# Patient Record
Sex: MALE | Race: WHITE | Employment: OTHER | URBAN - METROPOLITAN AREA
[De-identification: names, ages, dates, MRNs, and addresses within clinical notes are randomized per-mention and may not be internally consistent; named-entity substitution may affect disease eponyms.]

---

## 2018-04-21 ENCOUNTER — APPOINTMENT (OUTPATIENT)
Dept: GENERAL RADIOLOGY | Facility: HOSPITAL | Age: 63
End: 2018-04-21
Attending: EMERGENCY MEDICINE
Payer: MEDICARE

## 2018-04-21 ENCOUNTER — HOSPITAL ENCOUNTER (OUTPATIENT)
Facility: HOSPITAL | Age: 63
Setting detail: OBSERVATION
Discharge: NURSING, REHAB OR LONG-TERM CARE | End: 2018-04-22
Attending: EMERGENCY MEDICINE | Admitting: HOSPITALIST
Payer: MEDICARE

## 2018-04-21 DIAGNOSIS — R07.89 CHEST PAIN, ATYPICAL: Primary | ICD-10-CM

## 2018-04-21 DIAGNOSIS — R06.00 DYSPNEA, UNSPECIFIED TYPE: ICD-10-CM

## 2018-04-21 DIAGNOSIS — F11.23 NARCOTIC WITHDRAWAL (HCC): ICD-10-CM

## 2018-04-21 PROBLEM — R73.9 HYPERGLYCEMIA: Status: ACTIVE | Noted: 2018-04-21

## 2018-04-21 PROCEDURE — 99220 INITIAL OBSERVATION CARE,LEVL III: CPT | Performed by: INTERNAL MEDICINE

## 2018-04-21 PROCEDURE — 71046 X-RAY EXAM CHEST 2 VIEWS: CPT | Performed by: EMERGENCY MEDICINE

## 2018-04-21 RX ORDER — ONDANSETRON 2 MG/ML
4 INJECTION INTRAMUSCULAR; INTRAVENOUS EVERY 4 HOURS PRN
Status: DISCONTINUED | OUTPATIENT
Start: 2018-04-21 | End: 2018-04-21

## 2018-04-21 RX ORDER — OXYCODONE HYDROCHLORIDE 15 MG/1
15 TABLET, FILM COATED, EXTENDED RELEASE ORAL EVERY 12 HOURS PRN
COMMUNITY

## 2018-04-21 RX ORDER — ONDANSETRON 2 MG/ML
4 INJECTION INTRAMUSCULAR; INTRAVENOUS EVERY 4 HOURS PRN
Status: DISCONTINUED | OUTPATIENT
Start: 2018-04-21 | End: 2018-04-22

## 2018-04-21 RX ORDER — ONDANSETRON 2 MG/ML
4 INJECTION INTRAMUSCULAR; INTRAVENOUS ONCE
Status: COMPLETED | OUTPATIENT
Start: 2018-04-21 | End: 2018-04-21

## 2018-04-21 RX ORDER — ASPIRIN 325 MG
325 TABLET ORAL DAILY
Status: DISCONTINUED | OUTPATIENT
Start: 2018-04-22 | End: 2018-04-22

## 2018-04-21 RX ORDER — ONDANSETRON 4 MG/1
4 TABLET, ORALLY DISINTEGRATING ORAL EVERY 4 HOURS PRN
Status: DISCONTINUED | OUTPATIENT
Start: 2018-04-21 | End: 2018-04-22

## 2018-04-21 RX ORDER — LOPERAMIDE HYDROCHLORIDE 2 MG/1
2 CAPSULE ORAL EVERY 4 HOURS PRN
Status: DISCONTINUED | OUTPATIENT
Start: 2018-04-21 | End: 2018-04-22

## 2018-04-21 RX ORDER — SODIUM CHLORIDE 9 MG/ML
1000 INJECTION, SOLUTION INTRAVENOUS ONCE
Status: COMPLETED | OUTPATIENT
Start: 2018-04-21 | End: 2018-04-21

## 2018-04-21 RX ORDER — SODIUM CHLORIDE 9 MG/ML
INJECTION, SOLUTION INTRAVENOUS CONTINUOUS
Status: ACTIVE | OUTPATIENT
Start: 2018-04-21 | End: 2018-04-21

## 2018-04-21 RX ORDER — POLYETHYLENE GLYCOL 3350 17 G/17G
17 POWDER, FOR SOLUTION ORAL DAILY PRN
Status: DISCONTINUED | OUTPATIENT
Start: 2018-04-21 | End: 2018-04-22

## 2018-04-21 RX ORDER — BISACODYL 10 MG
10 SUPPOSITORY, RECTAL RECTAL
Status: DISCONTINUED | OUTPATIENT
Start: 2018-04-21 | End: 2018-04-22

## 2018-04-21 RX ORDER — ACETAMINOPHEN 325 MG/1
650 TABLET ORAL EVERY 6 HOURS PRN
Status: DISCONTINUED | OUTPATIENT
Start: 2018-04-21 | End: 2018-04-22

## 2018-04-21 RX ORDER — IBUPROFEN 400 MG/1
400 TABLET ORAL EVERY 4 HOURS PRN
Status: DISCONTINUED | OUTPATIENT
Start: 2018-04-21 | End: 2018-04-22

## 2018-04-21 RX ORDER — TRAZODONE HYDROCHLORIDE 100 MG/1
50 TABLET ORAL NIGHTLY PRN
Status: DISCONTINUED | OUTPATIENT
Start: 2018-04-21 | End: 2018-04-22

## 2018-04-21 RX ORDER — DICYCLOMINE HCL 20 MG
20 TABLET ORAL EVERY 4 HOURS PRN
Status: DISCONTINUED | OUTPATIENT
Start: 2018-04-21 | End: 2018-04-22

## 2018-04-21 RX ORDER — LORAZEPAM 1 MG/1
1 TABLET ORAL EVERY 4 HOURS PRN
Status: DISCONTINUED | OUTPATIENT
Start: 2018-04-21 | End: 2018-04-22

## 2018-04-21 RX ORDER — NITROGLYCERIN 0.4 MG/1
0.4 TABLET SUBLINGUAL EVERY 5 MIN PRN
Status: DISCONTINUED | OUTPATIENT
Start: 2018-04-21 | End: 2018-04-22

## 2018-04-21 RX ORDER — DOCUSATE SODIUM 100 MG/1
100 CAPSULE, LIQUID FILLED ORAL 2 TIMES DAILY
Status: DISCONTINUED | OUTPATIENT
Start: 2018-04-21 | End: 2018-04-22

## 2018-04-21 RX ORDER — ONDANSETRON 2 MG/ML
4 INJECTION INTRAMUSCULAR; INTRAVENOUS EVERY 6 HOURS PRN
Status: DISCONTINUED | OUTPATIENT
Start: 2018-04-21 | End: 2018-04-22

## 2018-04-21 RX ORDER — ASPIRIN 81 MG/1
324 TABLET, CHEWABLE ORAL ONCE
Status: COMPLETED | OUTPATIENT
Start: 2018-04-21 | End: 2018-04-21

## 2018-04-21 RX ORDER — LORAZEPAM 2 MG/ML
0.5 INJECTION INTRAMUSCULAR ONCE
Status: COMPLETED | OUTPATIENT
Start: 2018-04-21 | End: 2018-04-21

## 2018-04-21 RX ORDER — SODIUM PHOSPHATE, DIBASIC AND SODIUM PHOSPHATE, MONOBASIC 7; 19 G/133ML; G/133ML
1 ENEMA RECTAL ONCE AS NEEDED
Status: DISCONTINUED | OUTPATIENT
Start: 2018-04-21 | End: 2018-04-22

## 2018-04-21 RX ORDER — LORAZEPAM 2 MG/ML
1 INJECTION INTRAMUSCULAR ONCE
Status: COMPLETED | OUTPATIENT
Start: 2018-04-21 | End: 2018-04-21

## 2018-04-21 RX ORDER — LORAZEPAM 0.5 MG/1
0.5 TABLET ORAL EVERY 4 HOURS PRN
Status: DISCONTINUED | OUTPATIENT
Start: 2018-04-21 | End: 2018-04-22

## 2018-04-21 NOTE — ED PROVIDER NOTES
Patient Seen in: [facility] Emergency Department    History   Patient presents with:  Eval-P (psychiatric)    Stated Complaint: detox of oxycontin    HPI    Patient is a 19-year-old male presents emergency room with a history of OxyContin use that h °C) (Temporal)   Resp 14   Ht 182.9 cm (6')   Wt 88.5 kg   SpO2 98%   BMI 26.45 kg/m²         Physical Exam  GENERAL: Well-developed, well-nourished male sitting up breathing easily in no apparent distress patient is nontoxic in appearance. Booker Gonzalez   HEENT: Head i units/mL. CBC WITH DIFFERENTIAL WITH PLATELET    Narrative: The following orders were created for panel order CBC WITH DIFFERENTIAL WITH PLATELET.   Procedure                               Abnormality         Status                     --------- further care.       Admission disposition: 4/21/2018  7:30 PM           Disposition and Plan     Clinical Impression:  Chest pain, atypical  (primary encounter diagnosis)  Dyspnea, unspecified type  Narcotic withdrawal (Juliet Mcmanus)    Disposition:  Admit  4/21/201

## 2018-04-21 NOTE — ED INITIAL ASSESSMENT (HPI)
Patient states \"I am here for oxycontin withdrawal.\" Patient reports he ran out of oxycontin yesterday, approximately 30 hours ago. He reports that his PCP decreased his dose from 3 tablets a day to 2 tablets a day.  Patient states he saw his PCP yesterda

## 2018-04-22 VITALS
HEART RATE: 52 BPM | BODY MASS INDEX: 26.41 KG/M2 | SYSTOLIC BLOOD PRESSURE: 149 MMHG | WEIGHT: 195 LBS | DIASTOLIC BLOOD PRESSURE: 71 MMHG | HEIGHT: 72 IN | TEMPERATURE: 98 F | OXYGEN SATURATION: 98 % | RESPIRATION RATE: 18 BRPM

## 2018-04-22 PROBLEM — F11.20 UNCOMPLICATED OPIOID DEPENDENCE (HCC): Status: ACTIVE | Noted: 2018-04-22

## 2018-04-22 PROBLEM — F32.A DEPRESSION: Status: ACTIVE | Noted: 2018-04-22

## 2018-04-22 PROBLEM — F41.9 ANXIETY DISORDER, UNSPECIFIED: Status: ACTIVE | Noted: 2018-04-22

## 2018-04-22 PROCEDURE — 99217 OBSERVATION CARE DISCHARGE: CPT | Performed by: HOSPITALIST

## 2018-04-22 PROCEDURE — 99222 1ST HOSP IP/OBS MODERATE 55: CPT | Performed by: OTHER

## 2018-04-22 RX ORDER — BUPRENORPHINE 2 MG/1
4 TABLET SUBLINGUAL AS NEEDED
Status: DISCONTINUED | OUTPATIENT
Start: 2018-04-22 | End: 2018-04-22

## 2018-04-22 RX ORDER — BUPRENORPHINE 2 MG/1
2 TABLET SUBLINGUAL EVERY 6 HOURS PRN
Status: DISCONTINUED | OUTPATIENT
Start: 2018-04-25 | End: 2018-04-22

## 2018-04-22 RX ORDER — BUPRENORPHINE 2 MG/1
2 TABLET SUBLINGUAL EVERY 8 HOURS PRN
Status: DISCONTINUED | OUTPATIENT
Start: 2018-04-26 | End: 2018-04-22

## 2018-04-22 RX ORDER — BUPRENORPHINE 2 MG/1
2 TABLET SUBLINGUAL EVERY 2 HOUR PRN
Status: DISCONTINUED | OUTPATIENT
Start: 2018-04-23 | End: 2018-04-22

## 2018-04-22 RX ORDER — BUPRENORPHINE 2 MG/1
2 TABLET SUBLINGUAL EVERY 2 HOUR PRN
Status: DISCONTINUED | OUTPATIENT
Start: 2018-04-22 | End: 2018-04-22

## 2018-04-22 RX ORDER — BUPRENORPHINE 2 MG/1
2 TABLET SUBLINGUAL EVERY 4 HOURS PRN
Status: DISCONTINUED | OUTPATIENT
Start: 2018-04-24 | End: 2018-04-22

## 2018-04-22 NOTE — PLAN OF CARE
A/Ox4- noted to be forgetful at times  On RA  NSR on tele  C/o generalized pain all over his body, PRN Tylenol given per [facility]  Denies chest pain  VSS, afebrile  Per pt, only had 1 BM today- no diarrhea   No Opioids per MD  Pt [facility]

## 2018-04-22 NOTE — CONSULTS
[facility]  Psychiatric Evaluation    Teo Jaygibson YOB: 1955   Age/Gender 60year old male MRN [id number]   [facility] 3NE-A Attending Kelly Reynoso MD   Hosp Day # 0 PCP PHYSICIAN NONSTAFF     Date of Jaylene Fuller the next pain pill. Anxiety is moderate, and is related to the situation he is in with opiate dependency issues. The patient reports \"terrible\" sleep. He wakes frequently with nightmares. Appetite has been okay. Concentration \"is there. \"  The pa never abused. He dropped out of school after freshman year of high school. He never got a GED. The patient is not currently working but used to be a . He lives by himself. He is on disability. When asked about supports, he said \"myself. \" Secondary Psychiatric Diagnoses  Depression, nos. Anxiety, nos. Rule Out Diagnoses  Substance induced anxiety and depression. Pervasive Diagnoses  Disorders–deferred. Neurodevelopmental disorders none.     Pertinent Non-Psychiatric Diagnoses

## 2018-04-22 NOTE — H&P
[facility] HOSPITALIST  History and Physical     [facility] Patient Status:  Emergency    1955 MRN [id number]   Location [address] [facility] Attending Dipti Foster MD   Hosp Day # 0 PCP PHYSICIAN NONSTAFF     Chief Compl completed. Pertinent positives and negatives noted in the HPI. Physical Exam:    /66   Pulse 68   Temp 97.9 °F (36.6 °C) (Temporal)   Resp 19   Ht 6' (1.829 m)   Wt 195 lb (88.5 kg)   SpO2 96%   BMI 26.45 kg/m²   General: No acute distress.  Amaya discussed with pt and er    Grisel Alex MD  4/21/2018

## 2018-04-22 NOTE — PLAN OF CARE
DRUG ABUSE/DETOX    • Will have no detox symptoms and will verbalize plan for changing drug-related behavior Adequate for Discharge        GASTROINTESTINAL - ADULT    • Maintains or returns to baseline bowel function Adequate for Discharge        Patient/F

## 2018-04-22 NOTE — PLAN OF CARE
NURSING DISCHARGE NOTE    Discharged Home via Ambulance. Accompanied by Support staff  Belongings Taken by patient/family.     Report given to Richard Dumont RN @ [facility] @ [id number]   ETA ambulance 3pm.

## 2018-04-22 NOTE — PROGRESS NOTES
NURSING ADMISSION NOTE      Patient admitted via Cart  Oriented to room. Safety precautions initiated. Bed in low position. Call light in reach.     Admission navigator completed   Admitting orders received

## 2018-04-22 NOTE — DISCHARGE SUMMARY
[facility] HOSPITALIST  DISCHARGE SUMMARY     [facility] Patient Status:  Observation    1955 MRN [id number]   [facility] 3NE-A Attending Jennifer Greenwood MD   Hosp Day # 0 PCP PHYSICIAN NONSTAFF     Date of Admission: 2018  Da chest pain similar to previously when he has been in opioid withdrawal.  Pt feels better now and has no complaints. He was seen by psych liason and will be transferred to [facility] today. He is medically clear for transfer. Ischemic w/u has been negative.

## 2023-09-05 NOTE — PROGRESS NOTES
IM quick note:    Pt seen and examined. Chest pain now resolved, ischemic w/u including troponin x 3 negative. BP improved.     /59 (BP Location: Right arm)   Pulse 62   Temp 97.9 °F (36.6 °C) (Axillary)   Resp 18   Ht 6' (1.829 m)   Wt 195 lb (88.5 equal bilaterally

## (undated) NOTE — IP AVS SNAPSHOT
Patient Demographics     Address  [address] "[employer] Phone  [phone] [facility])  [phone] [facility] E-mail Address  [email or web address]      Emergency Contact(s)     Name Relation Home Work Mobile    ChesterUrsula Sister 6 [id number] cloNIDine (CATAPRES) partial tablet 0.05 mg 04/22/18 1022 Given      [id number] ibuprofen (MOTRIN) tab 400 mg 04/22/18 0334 Given      [id number] ondansetron HCl (ZOFRAN) injection 4 mg 04/21/18 1835 Given                    Recent Vital Signs 4. 97           4.44             Average      9.55           7.05             Twice average      23.99         11.04             Three times average           Non HDL Chol 104 <130 mg/dL — [facility]   Comment:           Reference ranges for non-HDL-C are b Jerome Valencia [[id number]]  Resulted: 04/21/18 2000, Result status: Final result   Ordering provider:  Isabela Sevilla MD  04/21/18 1823 Resulting lab:  [facility]    Specimen Information    Type Source Collected On   Blood — 04/21/18 1832          Co Ordering provider:  Shaun Ludwig MD  04/21/18 1816 Resulting lab:  [facility]    Specimen Information    Type Source Collected On   Blood — 04/21/18 1832          Components    Component Value Reference Range [facility] Lab   Troponin <0.046 <0.046 ng/mL — CBC W/ DIFFERENTIAL[[id number]]          Abnormal            Final result                 Please view results for these tests on the individual orders.     Specimen Information    Type Source Collected On   Blood — 04/21/18 1832            Testing Performed Past Medical History:   Diagnosis Date   • Chronic pain     neck pain s/p fusion   • Other specified arthropathy         Past Surgical History: Past Surgical History:  No date: ADDL NECK SPINE FUSION  9/2/2005: DISK SURG,ANTER,CERVICAL,SINGLE LVL      [facility]  04/21/18   1832   GLU  116*   BUN  10   CREATSERUM  0.91   GFRAA  103   GFRNAA  89   CA  9.3   ALB  4.0   NA  136   K  4.4   CL  103   CO2  26.0   ALKPHO  80   AST  47*   ALT  53   BILT  1.3   TP  8.0       Recent Labs   Lab  04/21/18 1832   PTP  13 Catie Muñoz is a 69-year-old man who presented to [facility] with a history of chronic pain as well as long-term OxyContin use and a complaint of frequent opiate withdrawal symptoms, and wanting to come off opiates.   He also described chest pain and he wa feels hopeless or helpless. He has not had any suicide thoughts. \"I have too many things to do in life. I am not a suicide kind of maryam. \"  The patient denies homicidal ideation. Past Psychiatric History:   There is no previous history of psychiatric When asked about supports, he said \"myself. \"  He does not do many activities because of his symptoms, but he does enjoy fishing, and would like to be able to get out and do it again.   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 [CC.2]     Pervasive Diagnoses[CC. 1]  Disorders–deferred. Neurodevelopmental disorders none. [CC.2]    Pertinent Non-Psychiatric Diagnoses[CC.1]  Cirrhosis. Hypertension. Status post neck fusion. Chronic pain. Filed:  2018 12:49 PM Date of Service:  2018 12:47 PM Status:  Signed    :  Jerrica Solomon MD (Physician)       [facility] HOSPITALIST  DISCHARGE SUMMARY     [facility] Patient Status:  Observation    1955 MRN [id number]   MYWN stents or heart surgeries. Patient is a smoker and has hypertension but no diabetes. Brief Synopsis: Pt was admitted with c/o chest pain similar to previously when he has been in opioid withdrawal.  Pt feels better now and has no complaints.   He was se Electronically signed by Kelsey Velasco MD on 4/22/2018 12:49 PM   Attribution Tavares    DK. 1 - Kelsey Velasco MD on 4/22/2018 12:47 PM                     Physical Therapy Notes (last 72 hours) (Notes from 4/19/2018  2:52 PM through 4/22/2018  2:52 PM)     N